# Patient Record
Sex: FEMALE | Race: WHITE
[De-identification: names, ages, dates, MRNs, and addresses within clinical notes are randomized per-mention and may not be internally consistent; named-entity substitution may affect disease eponyms.]

---

## 2020-10-12 ENCOUNTER — HOSPITAL ENCOUNTER (INPATIENT)
Dept: HOSPITAL 1 - ED | Age: 35
LOS: 3 days | Discharge: HOME | DRG: 419 | End: 2020-10-15
Attending: HOSPITALIST | Admitting: HOSPITALIST
Payer: COMMERCIAL

## 2020-10-12 VITALS
HEIGHT: 67 IN | BODY MASS INDEX: 29.51 KG/M2 | WEIGHT: 188 LBS | HEIGHT: 67 IN | WEIGHT: 188 LBS | BODY MASS INDEX: 29.51 KG/M2

## 2020-10-12 VITALS — SYSTOLIC BLOOD PRESSURE: 115 MMHG | DIASTOLIC BLOOD PRESSURE: 59 MMHG

## 2020-10-12 DIAGNOSIS — D72.829: ICD-10-CM

## 2020-10-12 DIAGNOSIS — K80.00: Primary | ICD-10-CM

## 2020-10-12 DIAGNOSIS — Z79.899: ICD-10-CM

## 2020-10-12 DIAGNOSIS — Z87.891: ICD-10-CM

## 2020-10-12 DIAGNOSIS — K21.9: ICD-10-CM

## 2020-10-12 DIAGNOSIS — E11.9: ICD-10-CM

## 2020-10-12 DIAGNOSIS — Z80.8: ICD-10-CM

## 2020-10-12 DIAGNOSIS — Z90.3: ICD-10-CM

## 2020-10-12 DIAGNOSIS — Z79.01: ICD-10-CM

## 2020-10-12 DIAGNOSIS — D50.9: ICD-10-CM

## 2020-10-12 LAB
ALBUMIN SERPL-MCNC: 3.4 G/DL (ref 3.4–5)
ALP SERPL-CCNC: 141 U/L (ref 46–116)
ALT SERPL-CCNC: 63 U/L (ref 14–59)
AMPHETAMINES UR QL SCN: (no result)
AST SERPL-CCNC: 145 U/L (ref 15–37)
BASOPHILS NFR BLD: 0.2 % (ref 0–2)
BILIRUB SERPL-MCNC: 0.9 MG/DL (ref 0.2–1)
BUN SERPL-MCNC: 10 MG/DL (ref 7–18)
CALCIUM SERPL-MCNC: 9.1 MG/DL (ref 8.5–10.1)
CHLORIDE SERPL-SCNC: 102 MMOL/L (ref 98–107)
CO2 SERPL-SCNC: 20.9 MMOL/L (ref 21–32)
CREAT SERPL-MCNC: 0.9 MG/DL (ref 0.6–1)
ERYTHROCYTE [DISTWIDTH] IN BLOOD BY AUTOMATED COUNT: 17.3 % (ref 11.5–14.5)
GFR SERPLBLD BASED ON 1.73 SQ M-ARVRAT: > 60 ML/MIN
GLUCOSE SERPL-MCNC: 172 MG/DL (ref 74–106)
IRON SERPL-MCNC: 19 UG/DL (ref 50–170)
LIPASE SERPL-CCNC: 196 IU/L (ref 73–393)
PLATELET # BLD: 276 X10^3MCL (ref 130–400)
POTASSIUM SERPL-SCNC: 3.5 MMOL/L (ref 3.5–5.1)
PROT SERPL-MCNC: 7.5 G/DL (ref 6.4–8.2)
SODIUM SERPL-SCNC: 137 MMOL/L (ref 136–145)
TIBC SERPL-MCNC: 434 UG/DL (ref 250–450)

## 2020-10-12 PROCEDURE — G0378 HOSPITAL OBSERVATION PER HR: HCPCS

## 2020-10-12 PROCEDURE — C1887 CATHETER, GUIDING: HCPCS

## 2020-10-12 PROCEDURE — A9537 TC99M MEBROFENIN: HCPCS

## 2020-10-12 NOTE — NUR
RECEIVED PT FROM ED VIA WHEELCHAIR, CAME IN DUE TO EPIGASTRIC PAIN X2 DAYS.
AAOX4. DENIES HEADACHE/DIZZINESS. ABLE TO FOLLOW COMMANDS. NO SOB NOTED, LUNG
SOUNDS CTA. DENIES CHEST PAIN/PRESSURE. STATED THAT SHE HAS 2/10 EPIGASTRIC TO
RIGHT UPPER ABDOMINAL PAIN DESCRIBED AS ACHING. DNEIES NAUSEA/VOMITING. LAST
BM=10/12/20, FORMED. BOWEL SOUNDS ACTIVE. VOIDS. IV SITES ON THE LAC AND RFA
ARE PATENT AND INTACT. SIDE RAILS UPX2. CALL LIGHT ON REACH. ENDORSED TO
PRIMARY NURSE AMADEO FOR CONTINUITY OF CARE

## 2020-10-12 NOTE — NUR
SPOKE WITH VIKRAM FROM [facility]; ASKED IF HIDA SCAN CAN BE POSTPONED UNTIL
TOMORROW MORNING; OKAY PER DR PARIS.

## 2020-10-12 NOTE — NUR
PT SITTING SUPINE IN GURNEY IN A POSITION OF A COMFORT. PT AAO X4 RESPIRATIONS
E/U NO DISTRESS NOTED.

## 2020-10-12 NOTE — NUR
PT NOTED LAYING IN ER GURNEY IN POSITION OF COMFORT. PT REPORTS IMPROVED
ABDOMINAL PAIN AT THIS TIME, 4/10. PT VITAL SIGNS STABLE, NO DISTRESS NOTED.
WILL CONTINUE TO MONITOR

## 2020-10-12 NOTE — NUR
PT BIB SELF C/O RUQ ABDOMINAL PAIN X 1 DAY. PT REPORTS VOMITING ONE TIME THIS
MORNING AND REPORTS SEEING "SPIT". PT DENIES ANY BLOOD IN EMESIS. PT REPORTS
BACK PAIN X 1 DAY AND REPORTS "I THINK IT IS BECAUSE I HAVE BEEN SO TENSE
LATELY". PT REPORTS GOING TO URGENT CARE TODAY AND "THEY TOLD ME I NEEDED TO
COME TO THE ER". PT SKIN APPEARS WARM PINK, DRY, IN TACT. PT DENIES ANY
HEADACHES, DIZZINESS OR DIARRHEA AT THIS TIME. PT AAOX4, RESP E/U. MSE
COMPLETED BY DR PARIS. AWAITING FURTHER ORDERS

## 2020-10-13 VITALS — SYSTOLIC BLOOD PRESSURE: 99 MMHG | DIASTOLIC BLOOD PRESSURE: 39 MMHG

## 2020-10-13 VITALS — DIASTOLIC BLOOD PRESSURE: 51 MMHG | SYSTOLIC BLOOD PRESSURE: 117 MMHG

## 2020-10-13 VITALS — DIASTOLIC BLOOD PRESSURE: 60 MMHG | SYSTOLIC BLOOD PRESSURE: 100 MMHG

## 2020-10-13 VITALS — SYSTOLIC BLOOD PRESSURE: 93 MMHG | DIASTOLIC BLOOD PRESSURE: 51 MMHG

## 2020-10-13 VITALS — DIASTOLIC BLOOD PRESSURE: 52 MMHG | SYSTOLIC BLOOD PRESSURE: 99 MMHG

## 2020-10-13 LAB
ALT SERPL-CCNC: 279 U/L (ref 14–59)
AST SERPL-CCNC: 554 U/L (ref 15–37)
BASOPHILS NFR BLD: 0.5 % (ref 0–2)
BUN SERPL-MCNC: 7 MG/DL (ref 7–18)
CALCIUM SERPL-MCNC: 7.7 MG/DL (ref 8.5–10.1)
CHLORIDE SERPL-SCNC: 108 MMOL/L (ref 98–107)
CO2 SERPL-SCNC: 22 MMOL/L (ref 21–32)
CREAT SERPL-MCNC: 0.8 MG/DL (ref 0.6–1)
ERYTHROCYTE [DISTWIDTH] IN BLOOD BY AUTOMATED COUNT: 17.3 % (ref 11.5–14.5)
GFR SERPLBLD BASED ON 1.73 SQ M-ARVRAT: > 60 ML/MIN
GLUCOSE SERPL-MCNC: 83 MG/DL (ref 74–106)
MAGNESIUM SERPL-MCNC: 1.7 MG/DL (ref 1.8–2.4)
PHOSPHATE SERPL-MCNC: 2.7 MG/DL (ref 2.5–4.9)
PLATELET # BLD: 203 X10^3MCL (ref 130–400)
POTASSIUM SERPL-SCNC: 3.4 MMOL/L (ref 3.5–5.1)
SODIUM SERPL-SCNC: 140 MMOL/L (ref 136–145)

## 2020-10-13 NOTE — NUR
Pt slept overnight, condition stable
All due care rendered, no signs of distress noted
Abdominal pain tolerable per pt
Flagyl and cipro r/t cholecystitis, afebrile, vitals stable
Ambulated to restroom, voided

## 2020-10-13 NOTE — NUR
PT RECEIVED LYING SUPINE IN BED WATCHING TV ON HER PHONE.  A/OX4, CALM AND
PLEASANT.  RESPIRTIONS UNLABORED, CTA, RA, DENIES SOB.  PERIPHERAL PULSES
STRONG, NO EDEMA NOTED.  ABD SOFT AND ROUND.  DENIES N/V AND ABD PAIN AT THIS
TIME.  NO URINARY ISSUES.  SKIN INTACT.  AMBUALTORY WITH STEADY GAIT.  BED IN
LOWEST POSITION, SIDE RAILS X 2, CALL LIGHT WITHIN REACH.

## 2020-10-13 NOTE — NUR
PATIENT S/P HIDA SCAN DIET WAS RESUMED WITH LOW FAT, DR MACHUCA WANTS TO DO LAP
LENA. CONSENT PAPERS WILL BE PREPARED AND IT CAN BE OBTAINED WHEN PATIENT AND
MD SPEAK ABOUT PROCEDURE, WHEN DOING THEIR ROUNDS IT WAS MENTIONED BREIFLY BUT
PATIENT AND MD WILL SPEAK ABOUT IT.

## 2020-10-14 VITALS — SYSTOLIC BLOOD PRESSURE: 106 MMHG | DIASTOLIC BLOOD PRESSURE: 57 MMHG

## 2020-10-14 VITALS — DIASTOLIC BLOOD PRESSURE: 59 MMHG | SYSTOLIC BLOOD PRESSURE: 109 MMHG

## 2020-10-14 VITALS — DIASTOLIC BLOOD PRESSURE: 43 MMHG | SYSTOLIC BLOOD PRESSURE: 107 MMHG

## 2020-10-14 VITALS — SYSTOLIC BLOOD PRESSURE: 97 MMHG | DIASTOLIC BLOOD PRESSURE: 46 MMHG

## 2020-10-14 VITALS — SYSTOLIC BLOOD PRESSURE: 110 MMHG | DIASTOLIC BLOOD PRESSURE: 52 MMHG

## 2020-10-14 LAB
ALBUMIN SERPL-MCNC: 2.7 G/DL (ref 3.4–5)
ALP SERPL-CCNC: 171 U/L (ref 46–116)
ALT SERPL-CCNC: 363 U/L (ref 14–59)
AST SERPL-CCNC: 364 U/L (ref 15–37)
BASOPHILS NFR BLD: 0.9 % (ref 0–2)
BILIRUB SERPL-MCNC: 1.54 MG/DL (ref 0.2–1)
BUN SERPL-MCNC: 4 MG/DL (ref 7–18)
CALCIUM SERPL-MCNC: 7.6 MG/DL (ref 8.5–10.1)
CHLORIDE SERPL-SCNC: 107 MMOL/L (ref 98–107)
CO2 SERPL-SCNC: 21.7 MMOL/L (ref 21–32)
CREAT SERPL-MCNC: 0.7 MG/DL (ref 0.6–1)
ERYTHROCYTE [DISTWIDTH] IN BLOOD BY AUTOMATED COUNT: 17.6 % (ref 11.5–14.5)
GFR SERPLBLD BASED ON 1.73 SQ M-ARVRAT: > 60 ML/MIN
GLUCOSE SERPL-MCNC: 94 MG/DL (ref 74–106)
PLATELET # BLD: 201 X10^3MCL (ref 130–400)
POTASSIUM SERPL-SCNC: 3.4 MMOL/L (ref 3.5–5.1)
PROT SERPL-MCNC: 6 G/DL (ref 6.4–8.2)
SODIUM SERPL-SCNC: 140 MMOL/L (ref 136–145)

## 2020-10-14 PROCEDURE — 0FT44ZZ RESECTION OF GALLBLADDER, PERCUTANEOUS ENDOSCOPIC APPROACH: ICD-10-PCS | Performed by: SURGERY

## 2020-10-14 NOTE — NUR
REPORT GIVEN TO ASHLEY DOMÍNGUEZ. PT REMAINED STABLE THROUGHOUT MY SHIFT. ALL QUESTIONS
AND CONCERNS ADDRESSED. ALL CARES ENDORSED.

## 2020-10-14 NOTE — NUR
PT AWAKE AND ALERT.  RESPIRATIOSN UNLABORED.  NO SOB OR CHEST PAIN NOTED OR
REPROTED DURING SHIFT.  PT REPORTED ABD PAIN BUT STATED WAS TOLERABLE AND
REFUSED PAIN MEDS.  NO BM DURING SHIFT OR URINARY ISSUES.  NO
HYPO/HYPERGLYCEMIC EVENTS.  LAST FS 95.  PT MAINTAINED NPO IN PREPARATION FOR
SX THIS AM.  IV RFA 22G RUNNING NS@ 100ML/HR PATENT, NO COMPLICATIONS TO SITE.
 BED IN LOWEST POSITION, SIDE RAILS X 2, CALL LIGHT WITHIN REACH

## 2020-10-14 NOTE — NUR
PT RETURNED FROM OR. PT REMAINS STABLE AT THIS TIME. BP 97/46 MAP 67 HR 54 RR
18 TEMP 97.8 O2 SAT 98% RA. PT DENIES DISCOMFORT AT THIS TIME. WILL CONTINUE
TO MONITOR PT.

## 2020-10-14 NOTE — NUR
PT SLEEPING IN BED SUPINE.  RESPIRATIONS EVEN, UNLABORED.  NON VERBAL PAIN
INDICATORS ABSENT.  IV RFA RUNNING NS@100ml/hr, PATENT, NO COMPLICATIONS TO
SIRE.  BED IN LWOEST POSITION, SIDE RAILS X 2, CALL LGIHT WITHIN REACH

## 2020-10-14 NOTE — NUR
RECEIVED PT FROM RHEA DOMÍNGUEZ. PT IS AAOX4. PT MED SURG STATUS. PT DENIES CHEST
PAIN. PT ON RA, NO SOB OR DISTRESS. IV TO RFA, HEPLOCKED, CDI, AND PATENT. PT
DENIES PAIN OR DISCOMFORT. ALL COMFORT AND SAFETY MEASURES IN PLACE. BED IN
LOW POSITION, 2 SIDE RAILS UP. CALL LIGHT WITH IN REACH. ALL QUESTIONS AND
CONCERNS ADDRESSED. WILL CONTINUE TO MONITOR PT.

## 2020-10-14 NOTE — NUR
RECEIVED REPORT FROM DAY SHIFT RN. PT AA&O X4. NO SOB ON ROOM AIR. S/P LAP
LENA. NO C/O N/V/ABD PAIN AT THIS TIME. PT STATES PASSING FLATUS. BANDAID
X4 TO ABD. BANDAID TO MID LOWER ABD WITH DRY BLOOD STAIN. RUBIN DRAIN TO RLQ
WITH SEROSANGUINEOUS FLUID. IV TO RFA, NS INFUSING. SAFETY MEASURES IN PLACE.
INSTRUCTED PT TO CALL FOR ASSISTANCE.

## 2020-10-15 VITALS — DIASTOLIC BLOOD PRESSURE: 58 MMHG | SYSTOLIC BLOOD PRESSURE: 116 MMHG

## 2020-10-15 VITALS — SYSTOLIC BLOOD PRESSURE: 116 MMHG | DIASTOLIC BLOOD PRESSURE: 58 MMHG

## 2020-10-15 VITALS — SYSTOLIC BLOOD PRESSURE: 101 MMHG | DIASTOLIC BLOOD PRESSURE: 51 MMHG

## 2020-10-15 VITALS — DIASTOLIC BLOOD PRESSURE: 59 MMHG | SYSTOLIC BLOOD PRESSURE: 102 MMHG

## 2020-10-15 LAB
ALBUMIN SERPL-MCNC: 2.8 G/DL (ref 3.4–5)
ALP SERPL-CCNC: 168 U/L (ref 46–116)
ALT SERPL-CCNC: 291 U/L (ref 14–59)
AST SERPL-CCNC: 184 U/L (ref 15–37)
BASOPHILS NFR BLD: 0.4 % (ref 0–2)
BASOPHILS NFR BLD: 0.4 % (ref 0–2)
BILIRUB SERPL-MCNC: 0.7 MG/DL (ref 0.2–1)
BUN SERPL-MCNC: 6 MG/DL (ref 7–18)
CALCIUM SERPL-MCNC: 7.9 MG/DL (ref 8.5–10.1)
CHLORIDE SERPL-SCNC: 107 MMOL/L (ref 98–107)
CO2 SERPL-SCNC: 21.4 MMOL/L (ref 21–32)
CREAT SERPL-MCNC: 0.8 MG/DL (ref 0.6–1)
ERYTHROCYTE [DISTWIDTH] IN BLOOD BY AUTOMATED COUNT: 17.4 % (ref 11.5–14.5)
ERYTHROCYTE [DISTWIDTH] IN BLOOD BY AUTOMATED COUNT: 18.1 % (ref 11.5–14.5)
GFR SERPLBLD BASED ON 1.73 SQ M-ARVRAT: > 60 ML/MIN
GLUCOSE SERPL-MCNC: 90 MG/DL (ref 74–106)
MAGNESIUM SERPL-MCNC: 1.7 MG/DL (ref 1.8–2.4)
PHOSPHATE SERPL-MCNC: 2.8 MG/DL (ref 2.5–4.9)
PLATELET # BLD: 205 X10^3MCL (ref 130–400)
PLATELET # BLD: 214 X10^3MCL (ref 130–400)
POTASSIUM SERPL-SCNC: 3.4 MMOL/L (ref 3.5–5.1)
PROT SERPL-MCNC: 5.6 G/DL (ref 6.4–8.2)
SODIUM SERPL-SCNC: 141 MMOL/L (ref 136–145)

## 2020-10-15 NOTE — NUR
PT RESTED IN INTERVALS DURING SHIFT. NO SOB ON ROOM AIR. TORADOL GIVEN X1 FOR
ABD PAIN. NO C/O N/V. PT AMBULATES IN THE HALLWAY. PT VOIDS FREELY. NO BM
DURING SHIFT BUT PT REPORTS PASSING FLATUS. RUBIN DRAIN 15ML SEROSANGUINOUS
FLUID. ABD AND RUBIN DRESSING INTACT. SAFETY MEASURES MAINTAINED. CALL LIGHT
WITHIN REACH. WILL ENDORSE CARE TO DAY SHIFT RN.

## 2020-10-15 NOTE — NUR
D/C INSTRUCTIONS AND PRESCRIPTION GIVEN TO PT. PT VERBALIZED UNDERSTANDING OF
D/C INSTRUCTIONS. IV REMOVED. DRESSING APPLIED. ID BANDS REMOVED. RUBIN DRAIN
REMOVED. DRESSING APPLIED. ALL QUESTIONS AND CONCERNS ADDRESSED. ALL NEEDS
MET. AWAITING PT'S RIDE.

## 2020-10-15 NOTE — NUR
RECEIVED PT FROM ASHLEY DOMÍNGUEZ. PT IS AAOX4. PT MED SURG STATUS. PT DENIES CHEST
PAIN. PT ON RA, NO SOB OR DISTRESS. IV TO RFA, HEPLOCKED, CDI, AND PATENT. PT
DENIES PAIN OR DISCOMFORT. ALL COMFORT AND SAFETY MEASURES IN PLACE. BED IN
LOW POSITION, 2 SIDE RAILS UP. CALL LIGHT WITH IN REACH. ALL QUESTIONS AND
CONCERNS ADDRESSED. WILL CONTINUE TO MONITOR PT.

## 2020-10-15 NOTE — NUR
SPOKE TO DR MONCADA ABOUT RUBIN DRAIN REMOVAL. DR MONCADA MADE AWARE THAT RUBIN
DRAINING 15 CC/SHIFT. DR MONCADA STATED  OKAY TO REMOVE RUBIN DRAIN. ALL
QUESTIONS AND CONCERNS ADDRESSED. WILL CONTINUE TO MONITOR PT.

## 2024-03-21 NOTE — NUR
DR STRICKLAND MADE AWARE OF POTASSIUM 3.4. DR STRICKLAND GAVE VERBAL ORDER FOR KCL 20
MEQ PO. ORDER CONFIRMED. ALL QUESTIONS AND CONCERNS ADDRESSED. WILL CONTINUE
TO MONITOR PT. DISPLAY PLAN FREE TEXT